# Patient Record
Sex: FEMALE | Race: WHITE | NOT HISPANIC OR LATINO | URBAN - METROPOLITAN AREA
[De-identification: names, ages, dates, MRNs, and addresses within clinical notes are randomized per-mention and may not be internally consistent; named-entity substitution may affect disease eponyms.]

---

## 2017-12-22 ENCOUNTER — IMPORTED ENCOUNTER (OUTPATIENT)
Dept: URBAN - METROPOLITAN AREA CLINIC 1 | Facility: CLINIC | Age: 67
End: 2017-12-22

## 2017-12-22 PROBLEM — H26.493: Noted: 2017-12-22

## 2017-12-22 PROBLEM — H40.013: Noted: 2017-12-22

## 2017-12-22 PROBLEM — H16.143: Noted: 2017-12-22

## 2017-12-22 PROBLEM — H00.15: Noted: 2017-12-22

## 2017-12-22 PROBLEM — Z96.1: Noted: 2017-12-22

## 2017-12-22 PROBLEM — H04.123: Noted: 2017-12-22

## 2017-12-22 PROBLEM — H43.813: Noted: 2017-12-22

## 2017-12-22 PROCEDURE — 92014 COMPRE OPH EXAM EST PT 1/>: CPT

## 2017-12-22 PROCEDURE — 92133 CPTRZD OPH DX IMG PST SGM ON: CPT

## 2017-12-22 PROCEDURE — 92015 DETERMINE REFRACTIVE STATE: CPT

## 2017-12-22 NOTE — PATIENT DISCUSSION
1.  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 Risks and benefits discussed with pt and pt desires to schedule yag cap. 2. Chalazion left lower eyelid- Start hot compresses TID x 5 minutes for 3 weeks. If without improvement discussed with patient possible Incision and Drainage procedure. Risks and benefits discussed with patient and patient states full understanding. 3. Glaucoma Suspect OU (0.55/0.65): Stable IOP OU. Minimal thinning by OCT OU. Patient is considered Low Risk. Condition was discussed with patient and patient understands. Will continue to monitor patient for any progression in condition. Patient was advised to call us with any problems questions or concerns. 4.  KAREEM w/ PEK OU- Stable. The continuation of artificial tears were recommended. 5.  PVD OU - Old stable. 6.  Pseudophakia OU (Standard w/ LenSx OU)- Doing well. 7.  Return for an appointment for 1-8 weeks Yag Cap OD then OS with Dr. Savannah Ewing.

## 2018-01-19 ENCOUNTER — IMPORTED ENCOUNTER (OUTPATIENT)
Dept: URBAN - METROPOLITAN AREA CLINIC 1 | Facility: CLINIC | Age: 68
End: 2018-01-19

## 2018-01-19 PROBLEM — H26.491: Noted: 2018-01-19

## 2018-01-19 PROCEDURE — 66821 AFTER CATARACT LASER SURGERY: CPT

## 2018-02-02 ENCOUNTER — IMPORTED ENCOUNTER (OUTPATIENT)
Dept: URBAN - METROPOLITAN AREA CLINIC 1 | Facility: CLINIC | Age: 68
End: 2018-02-02

## 2018-02-02 PROBLEM — H26.492: Noted: 2018-02-02

## 2018-02-02 PROCEDURE — 66821 AFTER CATARACT LASER SURGERY: CPT

## 2018-02-02 NOTE — PATIENT DISCUSSION
YAG CAP OS: (Consent signed and scanned into attachments) 1 gtt Prolensa applied. The purpose and nature of the procedure possible alternative methods of treatment the risks involved and the possibility of complications were discussed with patient. The Patient wishes to proceed and the consent was signed. The laser was then performed under topical anesthesia with no complications. Post op instructions were given to patient as well as a follow-up appointment. Patient was advised to call our office if any questions or concerns.  RTC 6-8 week YAG PO

## 2018-03-29 ENCOUNTER — IMPORTED ENCOUNTER (OUTPATIENT)
Dept: URBAN - METROPOLITAN AREA CLINIC 1 | Facility: CLINIC | Age: 68
End: 2018-03-29

## 2018-03-29 PROCEDURE — 99024 POSTOP FOLLOW-UP VISIT: CPT

## 2018-03-29 NOTE — PATIENT DISCUSSION
PO YAG Cap OU: good result. MRX optional. Return for an appointment in Feb/ March 30 with Dr. Cisco Rodriguez.

## 2018-07-24 NOTE — PATIENT DISCUSSION
Resume normal activity. Resume any medications that were discontinued for  surgery. Stop cold compresses. Use prescribed Flonase nasal spray bid in affected nostril(s) for 6 months. Negra Meter Med Sinus Rinse q day for 6 months and prn congestion. At's prn for itching around tubes.   Plan f/u in 6 months for tube removal.

## 2018-08-13 NOTE — PATIENT DISCUSSION
(H01.001) Unspecified blepharitis right upper eyelid - Assesment : Examination revealed Blepharitis. - Plan : Warm soaks with massage to eyelid two times weekly. Glasses prescription updated and given.   RTC 2 years/EXAM

## 2018-08-13 NOTE — PATIENT DISCUSSION
(H01.004) Unspecified blepharitis left upper eyelid - Assesment : Examination revealed Blepharitis. - Plan : See plan # 1.

## 2018-11-13 NOTE — PATIENT DISCUSSION
The patient had a Garrett tube on the right side. Topical anesthetic drops were given to the affected eye. This was followed by severing the tube at the caruncle with holly scissors. A lighted nasal speculum was introduced to the affected nostril and holly sissors were used to cut the retaining 4.0 prolene suture. This was followed by introducing a duck-billed forceps. Under illumination by a lighted nasal speculum, the forceps were used to removed the retained silastic foreign body. The patient did well, and there were no complications.

## 2018-11-13 NOTE — PATIENT DISCUSSION
Recommend continued use of flonase and nasal rinses as directed. Consult with ENT at scheduled appointment. Follow up if tearing persists.

## 2019-02-21 ENCOUNTER — IMPORTED ENCOUNTER (OUTPATIENT)
Dept: URBAN - METROPOLITAN AREA CLINIC 1 | Facility: CLINIC | Age: 69
End: 2019-02-21

## 2019-02-21 PROBLEM — H43.813: Noted: 2019-02-21

## 2019-02-21 PROBLEM — H16.143: Noted: 2019-02-21

## 2019-02-21 PROBLEM — H40.013: Noted: 2019-02-21

## 2019-02-21 PROBLEM — H04.123: Noted: 2019-02-21

## 2019-02-21 PROBLEM — Z96.1: Noted: 2019-02-21

## 2019-02-21 PROCEDURE — 92014 COMPRE OPH EXAM EST PT 1/>: CPT

## 2019-02-21 NOTE — PATIENT DISCUSSION
1.  KAREEM w/ PEK OU- Controlled. Cont ATs TID OU routinely 2. Glaucoma Suspect OU (CD 0.55/0.65): CD stable. Past w/u negative. Patient is considered Low Risk. Condition was discussed with patient and patient understands. Will continue to monitor patient for any progression in condition. Patient was advised to call us with any problems questions or concerns. 3.  Pseudophakia OU - (LenSx OU) 4. PVD OU - RD precautions. Return for an appointment in 1 year 27 with Dr. Savannah Ewing.

## 2020-02-20 ENCOUNTER — IMPORTED ENCOUNTER (OUTPATIENT)
Dept: URBAN - METROPOLITAN AREA CLINIC 1 | Facility: CLINIC | Age: 70
End: 2020-02-20

## 2020-02-20 PROBLEM — H40.013: Noted: 2020-02-20

## 2020-02-20 PROBLEM — H16.143: Noted: 2020-02-20

## 2020-02-20 PROBLEM — H04.123: Noted: 2020-02-20

## 2020-02-20 PROCEDURE — 92014 COMPRE OPH EXAM EST PT 1/>: CPT

## 2020-02-20 NOTE — PATIENT DISCUSSION
1.  Glaucoma Suspect OU (CD 0.55/0.65) Neg Fm Hx. Risk of cupping. IOP WNL OU today on no gtts; Past w/u neg. Cont to observe off gtts. 2.  KAREEM w/ PEK OU- Increase ATs to TID OU routinely. 3.  Pseudophakia OU - (Standard w/ LenSx OU) H/o YAG Cap OU 4. PVD OU - Stable RD precautions. Letter to PCP MRx deferredReturn for an appointment in 1 yr 27 OCT with Dr. Kavya Hunt.

## 2020-06-02 NOTE — PATIENT DISCUSSION
Pt presents with severe light sensitivity, longstanding. 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 Recommended pt to wear sunglasses and UV protection to be worn to alleviate painful symptoms and ocular damage. Letter given to Pt to take to work.

## 2021-04-13 ENCOUNTER — IMPORTED ENCOUNTER (OUTPATIENT)
Dept: URBAN - METROPOLITAN AREA CLINIC 1 | Facility: CLINIC | Age: 71
End: 2021-04-13

## 2021-04-13 PROBLEM — Z96.1: Noted: 2021-04-13

## 2021-04-13 PROBLEM — H40.013: Noted: 2021-04-13

## 2021-04-13 PROBLEM — H43.813: Noted: 2021-04-13

## 2021-04-13 PROBLEM — H16.143: Noted: 2021-04-13

## 2021-04-13 PROBLEM — H04.123: Noted: 2021-04-13

## 2021-04-13 PROCEDURE — 99214 OFFICE O/P EST MOD 30 MIN: CPT

## 2021-04-13 PROCEDURE — 92133 CPTRZD OPH DX IMG PST SGM ON: CPT

## 2021-04-13 NOTE — PATIENT DISCUSSION
1.  Glaucoma Suspect OU (CD 0.55/0.65): No progression by OCT. IOP stable. Negative family hx. Patient is considered Low Risk. Condition was discussed with patient and patient understands. Will continue to monitor patient for any progression in condition. Patient was advised to call us with any problems questions or concerns. 2.  KAREEM w/ PEK OU- Recommend ATs TID OU routinely 3. Pseudophakia OU - (Standard w/ LenSx OU) H/o YAG Cap OU 4. PVD OU - RD precautions. Return for an appointment in 1 year 27 with Dr. Celso Yin.

## 2021-07-06 NOTE — PATIENT DISCUSSION
Surgery  Counseling: I have discussed the option of glasses versus cataract surgery versus following. It was explained that when vision no longer meets the patient?s visual needs and a new prescription for glasses is not likely to improve the patient?s visual symptoms, the option of cataract surgery is a reasonable next step. It was explained that there is no guarantee that removing the cataract will improve their visual symptoms, however; it is believed that the cataract is contributing to the patient's visual impairment and surgery may significantly improve both the visual and functional status of the patient. The risks, benefits and alternatives of surgery were discussed with the patient. After this discussion, the patient desires to proceed with cataract surgery with implantation of an intraocular lens to improve vision for distance and near .

## 2022-04-02 ASSESSMENT — VISUAL ACUITY
OD_CC: 20/25
OS_CC: 20/25
OD_CC: J2
OS_CC: 20/20
OD_CC: 20/20
OS_CC: 20/20-2
OD_CC: 20/20-2
OD_GLARE: 20/200
OS_CC: J2
OS_GLARE: 20/200
OD_CC: 20/20
OS_CC: 20/20
OS_CC: 20/20-2
OD_CC: 20/20

## 2022-04-02 ASSESSMENT — TONOMETRY
OS_IOP_MMHG: 13
OS_IOP_MMHG: 14
OS_IOP_MMHG: 13
OD_IOP_MMHG: 15
OS_IOP_MMHG: 16
OD_IOP_MMHG: 13
OD_IOP_MMHG: 16
OD_IOP_MMHG: 14
OD_IOP_MMHG: 12
OS_IOP_MMHG: 14

## 2022-08-12 ENCOUNTER — COMPREHENSIVE EXAM (OUTPATIENT)
Dept: URBAN - METROPOLITAN AREA CLINIC 1 | Facility: CLINIC | Age: 72
End: 2022-08-12

## 2022-08-12 DIAGNOSIS — Z96.1: ICD-10-CM

## 2022-08-12 DIAGNOSIS — H04.123: ICD-10-CM

## 2022-08-12 DIAGNOSIS — H16.143: ICD-10-CM

## 2022-08-12 DIAGNOSIS — H40.013: ICD-10-CM

## 2022-08-12 DIAGNOSIS — H43.813: ICD-10-CM

## 2022-08-12 PROCEDURE — 99214 OFFICE O/P EST MOD 30 MIN: CPT

## 2022-08-12 ASSESSMENT — TONOMETRY
OD_IOP_MMHG: 17
OS_IOP_MMHG: 17

## 2022-08-12 ASSESSMENT — VISUAL ACUITY
OS_SC: 20/20
OD_SC: 20/20

## 2022-08-12 NOTE — PATIENT DISCUSSION
(CD 0.55/0.65) IOP stable. Past work-up negative. Patient is considered low risk. Condition was discussed with patient and patient understands. Will continue to monitor patient for any progression in condition. Patient was advised to call us with any problems, questions, or concerns.

## 2023-08-18 ENCOUNTER — COMPREHENSIVE EXAM (OUTPATIENT)
Dept: URBAN - METROPOLITAN AREA CLINIC 1 | Facility: CLINIC | Age: 73
End: 2023-08-18

## 2023-08-18 DIAGNOSIS — H01.024: ICD-10-CM

## 2023-08-18 DIAGNOSIS — H04.123: ICD-10-CM

## 2023-08-18 DIAGNOSIS — H40.013: ICD-10-CM

## 2023-08-18 DIAGNOSIS — H43.813: ICD-10-CM

## 2023-08-18 DIAGNOSIS — H01.021: ICD-10-CM

## 2023-08-18 DIAGNOSIS — Z96.1: ICD-10-CM

## 2023-08-18 DIAGNOSIS — H16.143: ICD-10-CM

## 2023-08-18 PROCEDURE — 99214 OFFICE O/P EST MOD 30 MIN: CPT

## 2023-08-18 ASSESSMENT — TONOMETRY
OD_IOP_MMHG: 17
OS_IOP_MMHG: 16

## 2023-08-18 ASSESSMENT — VISUAL ACUITY
OS_SC: 20/20-2
OD_SC: 20/20-1
OU_SC: J1

## 2024-09-23 ENCOUNTER — COMPREHENSIVE EXAM (OUTPATIENT)
Dept: URBAN - METROPOLITAN AREA CLINIC 1 | Facility: CLINIC | Age: 74
End: 2024-09-23

## 2024-09-23 DIAGNOSIS — H04.123: ICD-10-CM

## 2024-09-23 DIAGNOSIS — Z96.1: ICD-10-CM

## 2024-09-23 DIAGNOSIS — H01.024: ICD-10-CM

## 2024-09-23 DIAGNOSIS — H43.813: ICD-10-CM

## 2024-09-23 DIAGNOSIS — H01.021: ICD-10-CM

## 2024-09-23 DIAGNOSIS — H16.143: ICD-10-CM

## 2024-09-23 DIAGNOSIS — H40.013: ICD-10-CM

## 2024-09-23 PROCEDURE — 99214 OFFICE O/P EST MOD 30 MIN: CPT
